# Patient Record
Sex: FEMALE | Race: WHITE | NOT HISPANIC OR LATINO | Employment: OTHER | URBAN - METROPOLITAN AREA
[De-identification: names, ages, dates, MRNs, and addresses within clinical notes are randomized per-mention and may not be internally consistent; named-entity substitution may affect disease eponyms.]

---

## 2018-01-19 ENCOUNTER — RECORDS - HEALTHEAST (OUTPATIENT)
Dept: LAB | Facility: CLINIC | Age: 63
End: 2018-01-19

## 2018-01-19 LAB
CHOLEST SERPL-MCNC: 193 MG/DL
FASTING STATUS PATIENT QL REPORTED: NORMAL
FASTING STATUS PATIENT QL REPORTED: NORMAL
GLUCOSE BLD-MCNC: 85 MG/DL (ref 70–125)
HDLC SERPL-MCNC: 77 MG/DL
LDLC SERPL CALC-MCNC: 104 MG/DL
TRIGL SERPL-MCNC: 58 MG/DL

## 2018-01-23 ENCOUNTER — COMMUNICATION - HEALTHEAST (OUTPATIENT)
Dept: ONCOLOGY | Facility: HOSPITAL | Age: 63
End: 2018-01-23

## 2018-05-15 ENCOUNTER — RECORDS - HEALTHEAST (OUTPATIENT)
Dept: ADMINISTRATIVE | Facility: OTHER | Age: 63
End: 2018-05-15

## 2018-05-24 ENCOUNTER — HOSPITAL ENCOUNTER (OUTPATIENT)
Dept: RESPIRATORY THERAPY | Facility: HOSPITAL | Age: 63
Discharge: HOME | End: 2018-05-24
Attending: SURGERY

## 2018-05-24 DIAGNOSIS — R91.1 LUNG NODULE: ICD-10-CM

## 2018-05-24 LAB
BASE EXCESS BLDA CALC-SCNC: -1.6 MMOL/L
COHGB MFR BLD: 98.2 % (ref 96–97)
HCO3, ARTERIAL CALC - HISTORICAL: 23.6 MMOL/L (ref 23–29)
HGB BLD-MCNC: 13 G/DL (ref 12–16)
O2/TOTAL GAS SETTING VFR VENT: ABNORMAL %
OXYHEMOGLOBIN - HISTORICAL: 95.9 % (ref 96–97)
PCO2 BLD: 37 MM HG (ref 35–45)
PH BLD: 7.4 [PH] (ref 7.37–7.44)
PO2 BLD: 77 MM HG (ref 80–90)
TEMPERATURE: 37 DEGREES C
VENTILATION MODE: ABNORMAL

## 2018-06-01 ENCOUNTER — RECORDS - HEALTHEAST (OUTPATIENT)
Dept: LAB | Facility: CLINIC | Age: 63
End: 2018-06-01

## 2018-06-01 ENCOUNTER — RECORDS - HEALTHEAST (OUTPATIENT)
Dept: ADMINISTRATIVE | Facility: OTHER | Age: 63
End: 2018-06-01

## 2018-06-01 LAB
ANION GAP SERPL CALCULATED.3IONS-SCNC: 10 MMOL/L (ref 5–18)
BUN SERPL-MCNC: 14 MG/DL (ref 8–22)
CALCIUM SERPL-MCNC: 9.3 MG/DL (ref 8.5–10.5)
CHLORIDE BLD-SCNC: 98 MMOL/L (ref 98–107)
CO2 SERPL-SCNC: 25 MMOL/L (ref 22–31)
CREAT SERPL-MCNC: 0.85 MG/DL (ref 0.6–1.1)
GFR SERPL CREATININE-BSD FRML MDRD: >60 ML/MIN/1.73M2
GLUCOSE BLD-MCNC: 89 MG/DL (ref 70–125)
MAGNESIUM SERPL-MCNC: 2.1 MG/DL (ref 1.8–2.6)
POTASSIUM BLD-SCNC: 3.9 MMOL/L (ref 3.5–5)
SODIUM SERPL-SCNC: 133 MMOL/L (ref 136–145)

## 2018-06-06 ENCOUNTER — HOSPITAL ENCOUNTER (OUTPATIENT)
Dept: PET IMAGING | Facility: HOSPITAL | Age: 63
Discharge: HOME | End: 2018-06-06
Attending: SURGERY

## 2018-06-06 DIAGNOSIS — R91.1 LUNG NODULE: ICD-10-CM

## 2018-06-06 LAB — GLUCOSE BLDC GLUCOMTR-MCNC: 78 MG/DL

## 2019-02-14 ENCOUNTER — RECORDS - HEALTHEAST (OUTPATIENT)
Dept: LAB | Facility: CLINIC | Age: 64
End: 2019-02-14

## 2019-02-14 LAB
FASTING STATUS PATIENT QL REPORTED: NORMAL
GLUCOSE BLD-MCNC: 93 MG/DL (ref 70–125)
VIT B12 SERPL-MCNC: 592 PG/ML (ref 213–816)

## 2019-03-28 ENCOUNTER — RECORDS - HEALTHEAST (OUTPATIENT)
Dept: LAB | Facility: CLINIC | Age: 64
End: 2019-03-28

## 2019-03-28 LAB
FOLATE SERPL-MCNC: 14.4 NG/ML
TSH SERPL DL<=0.005 MIU/L-ACNC: 1.26 UIU/ML (ref 0.3–5)

## 2020-03-25 ENCOUNTER — RECORDS - HEALTHEAST (OUTPATIENT)
Dept: ADMINISTRATIVE | Facility: OTHER | Age: 65
End: 2020-03-25

## 2020-04-01 ENCOUNTER — RECORDS - HEALTHEAST (OUTPATIENT)
Dept: ADMINISTRATIVE | Facility: OTHER | Age: 65
End: 2020-04-01

## 2020-04-02 ENCOUNTER — RECORDS - HEALTHEAST (OUTPATIENT)
Dept: LAB | Facility: CLINIC | Age: 65
End: 2020-04-02

## 2020-04-02 LAB
ERYTHROCYTE [DISTWIDTH] IN BLOOD BY AUTOMATED COUNT: 14 % (ref 11–14.5)
HCT VFR BLD AUTO: 37.1 % (ref 35–47)
HGB BLD-MCNC: 11.9 G/DL (ref 12–16)
MCH RBC QN AUTO: 28.1 PG (ref 27–34)
MCHC RBC AUTO-ENTMCNC: 32.1 G/DL (ref 32–36)
MCV RBC AUTO: 88 FL (ref 80–100)
PLATELET # BLD AUTO: 187 THOU/UL (ref 140–440)
PMV BLD AUTO: 9.1 FL (ref 8.5–12.5)
RBC # BLD AUTO: 4.23 MILL/UL (ref 3.8–5.4)
WBC: 53.3 THOU/UL (ref 4–11)

## 2020-04-08 ENCOUNTER — COMMUNICATION - HEALTHEAST (OUTPATIENT)
Dept: PULMONOLOGY | Facility: OTHER | Age: 65
End: 2020-04-08

## 2020-04-08 ENCOUNTER — AMBULATORY - HEALTHEAST (OUTPATIENT)
Dept: PULMONOLOGY | Facility: OTHER | Age: 65
End: 2020-04-08

## 2020-04-08 DIAGNOSIS — J44.9 COPD (CHRONIC OBSTRUCTIVE PULMONARY DISEASE) (H): ICD-10-CM

## 2020-05-06 ENCOUNTER — TRANSFERRED RECORDS (OUTPATIENT)
Dept: HEALTH INFORMATION MANAGEMENT | Facility: CLINIC | Age: 65
End: 2020-05-06
Payer: MEDICARE

## 2020-06-26 ENCOUNTER — COMMUNICATION - HEALTHEAST (OUTPATIENT)
Dept: PULMONOLOGY | Facility: OTHER | Age: 65
End: 2020-06-26

## 2020-07-27 ENCOUNTER — RECORDS - HEALTHEAST (OUTPATIENT)
Dept: LAB | Facility: CLINIC | Age: 65
End: 2020-07-27

## 2020-07-27 LAB
CHOLEST SERPL-MCNC: 197 MG/DL
FASTING STATUS PATIENT QL REPORTED: NORMAL
HDLC SERPL-MCNC: 86 MG/DL
LDLC SERPL CALC-MCNC: 97 MG/DL
TRIGL SERPL-MCNC: 72 MG/DL

## 2020-08-03 ENCOUNTER — TRANSFERRED RECORDS (OUTPATIENT)
Dept: HEALTH INFORMATION MANAGEMENT | Facility: CLINIC | Age: 65
End: 2020-08-03
Payer: MEDICARE

## 2020-08-10 ENCOUNTER — RECORDS - HEALTHEAST (OUTPATIENT)
Dept: ADMINISTRATIVE | Facility: OTHER | Age: 65
End: 2020-08-10

## 2020-08-13 ENCOUNTER — OFFICE VISIT - HEALTHEAST (OUTPATIENT)
Dept: PULMONOLOGY | Facility: OTHER | Age: 65
End: 2020-08-13

## 2020-08-13 DIAGNOSIS — J43.9 PULMONARY EMPHYSEMA, UNSPECIFIED EMPHYSEMA TYPE (H): ICD-10-CM

## 2020-08-13 DIAGNOSIS — J45.31 MILD PERSISTENT ASTHMA WITH EXACERBATION: ICD-10-CM

## 2020-08-13 RX ORDER — MONTELUKAST SODIUM 10 MG/1
10 TABLET ORAL AT BEDTIME
Status: SHIPPED | COMMUNITY
Start: 2020-08-13 | End: 2021-11-17

## 2020-08-13 ASSESSMENT — MIFFLIN-ST. JEOR: SCORE: 1180.45

## 2020-09-03 ENCOUNTER — COMMUNICATION - HEALTHEAST (OUTPATIENT)
Dept: PULMONOLOGY | Facility: OTHER | Age: 65
End: 2020-09-03

## 2020-10-22 ENCOUNTER — OFFICE VISIT - HEALTHEAST (OUTPATIENT)
Dept: PULMONOLOGY | Facility: OTHER | Age: 65
End: 2020-10-22

## 2020-10-22 DIAGNOSIS — J45.31 MILD PERSISTENT ASTHMA WITH EXACERBATION: ICD-10-CM

## 2020-10-22 DIAGNOSIS — J43.9 PULMONARY EMPHYSEMA, UNSPECIFIED EMPHYSEMA TYPE (H): ICD-10-CM

## 2020-10-22 RX ORDER — IPRATROPIUM BROMIDE AND ALBUTEROL SULFATE 2.5; .5 MG/3ML; MG/3ML
SOLUTION RESPIRATORY (INHALATION) PRN
Status: SHIPPED | COMMUNITY
Start: 2020-03-09

## 2020-10-22 ASSESSMENT — MIFFLIN-ST. JEOR: SCORE: 1184.99

## 2020-12-09 ENCOUNTER — OFFICE VISIT (OUTPATIENT)
Dept: URBAN - METROPOLITAN AREA CLINIC 84 | Facility: CLINIC | Age: 65
End: 2020-12-09

## 2020-12-09 ENCOUNTER — WEB ENCOUNTER (OUTPATIENT)
Dept: URBAN - METROPOLITAN AREA CLINIC 84 | Facility: CLINIC | Age: 65
End: 2020-12-09

## 2020-12-09 ENCOUNTER — DASHBOARD ENCOUNTERS (OUTPATIENT)
Age: 65
End: 2020-12-09

## 2020-12-09 VITALS
DIASTOLIC BLOOD PRESSURE: 82 MMHG | BODY MASS INDEX: 36.82 KG/M2 | HEIGHT: 65 IN | TEMPERATURE: 98.2 F | WEIGHT: 221 LBS | RESPIRATION RATE: 18 BRPM | SYSTOLIC BLOOD PRESSURE: 144 MMHG | HEART RATE: 70 BPM

## 2020-12-09 PROBLEM — 24700007: Status: ACTIVE | Noted: 2020-12-09

## 2020-12-09 PROBLEM — 38341003: Status: ACTIVE | Noted: 2020-12-09

## 2020-12-09 PROBLEM — 55822004: Status: ACTIVE | Noted: 2020-12-09

## 2020-12-09 PROBLEM — 162864005: Status: ACTIVE | Noted: 2020-12-09

## 2020-12-09 PROBLEM — 305058001: Status: ACTIVE | Noted: 2020-12-09

## 2020-12-09 RX ORDER — SERTRALINE HYDROCHLORIDE 25 MG/1
1 TABLET TABLET, FILM COATED ORAL ONCE A DAY
Status: ACTIVE | COMMUNITY

## 2020-12-09 RX ORDER — HYDROCHLOROTHIAZIDE 25 MG/1
1 TABLET IN THE MORNING TABLET ORAL ONCE A DAY
Status: ACTIVE | COMMUNITY

## 2020-12-09 RX ORDER — AMLODIPINE BESYLATE 5 MG/1
1 TABLET TABLET ORAL ONCE A DAY
Status: ACTIVE | COMMUNITY

## 2020-12-09 RX ORDER — INTERFERON BETA-1A 44 UG/.5ML
0.5 ML INJECTION, SOLUTION SUBCUTANEOUS
Status: ACTIVE | COMMUNITY

## 2020-12-09 RX ORDER — LOSARTAN POTASSIUM 50 MG/1
1 TABLET TABLET ORAL ONCE A DAY
Status: ACTIVE | COMMUNITY

## 2020-12-09 RX ORDER — MONTELUKAST SODIUM 10 MG/1
1 TABLET TABLET ORAL ONCE A DAY
Status: ACTIVE | COMMUNITY

## 2020-12-09 RX ORDER — ATORVASTATIN CALCIUM 40 MG/1
1 TABLET TABLET, FILM COATED ORAL ONCE A DAY
Status: ACTIVE | COMMUNITY

## 2020-12-09 RX ORDER — CLOPIDOGREL 75 MG/1
1 TABLET TABLET, FILM COATED ORAL ONCE A DAY
Status: ACTIVE | COMMUNITY

## 2020-12-09 RX ORDER — LORATADINE 10 MG
1 TABLET TABLET ORAL ONCE A DAY
Status: ACTIVE | COMMUNITY

## 2020-12-31 LAB
HPV SOURCE: NORMAL
HUMAN PAPILLOMA VIRUS 16 DNA: NEGATIVE
HUMAN PAPILLOMA VIRUS 18 DNA: NEGATIVE
HUMAN PAPILLOMA VIRUS FINAL DIAGNOSIS: NORMAL
HUMAN PAPILLOMA VIRUS OTHER HR: NEGATIVE
SPECIMEN DESCRIPTION: NORMAL

## 2021-01-06 ENCOUNTER — AMBULATORY - HEALTHEAST (OUTPATIENT)
Dept: CARDIOLOGY | Facility: CLINIC | Age: 66
End: 2021-01-06

## 2021-01-06 ENCOUNTER — RECORDS - HEALTHEAST (OUTPATIENT)
Dept: ADMINISTRATIVE | Facility: OTHER | Age: 66
End: 2021-01-06

## 2021-01-07 ENCOUNTER — OFFICE VISIT (OUTPATIENT)
Dept: URBAN - METROPOLITAN AREA SURGERY CENTER 20 | Facility: SURGERY CENTER | Age: 66
End: 2021-01-07
Payer: MEDICARE

## 2021-01-07 ENCOUNTER — OFFICE VISIT - HEALTHEAST (OUTPATIENT)
Dept: CARDIOLOGY | Facility: CLINIC | Age: 66
End: 2021-01-07

## 2021-01-07 DIAGNOSIS — Z12.11 COLON CANCER SCREENING: ICD-10-CM

## 2021-01-07 DIAGNOSIS — R00.2 PALPITATIONS: ICD-10-CM

## 2021-01-07 PROCEDURE — G9935 CANC NOT DETECTD DURING SRCN: HCPCS | Performed by: INTERNAL MEDICINE

## 2021-01-07 PROCEDURE — G8907 PT DOC NO EVENTS ON DISCHARG: HCPCS | Performed by: INTERNAL MEDICINE

## 2021-01-07 PROCEDURE — G0121 COLON CA SCRN NOT HI RSK IND: HCPCS | Performed by: INTERNAL MEDICINE

## 2021-01-07 RX ORDER — INTERFERON BETA-1A 44 UG/.5ML
0.5 ML INJECTION, SOLUTION SUBCUTANEOUS
Status: ACTIVE | COMMUNITY

## 2021-01-07 RX ORDER — MONTELUKAST SODIUM 10 MG/1
1 TABLET TABLET ORAL ONCE A DAY
Status: ACTIVE | COMMUNITY

## 2021-01-07 RX ORDER — HYDROCHLOROTHIAZIDE 25 MG/1
1 TABLET IN THE MORNING TABLET ORAL ONCE A DAY
Status: ACTIVE | COMMUNITY

## 2021-01-07 RX ORDER — AMLODIPINE BESYLATE 5 MG/1
1 TABLET TABLET ORAL ONCE A DAY
Status: ACTIVE | COMMUNITY

## 2021-01-07 RX ORDER — ATORVASTATIN CALCIUM 40 MG/1
1 TABLET TABLET, FILM COATED ORAL ONCE A DAY
Status: ACTIVE | COMMUNITY

## 2021-01-07 RX ORDER — CLOPIDOGREL 75 MG/1
1 TABLET TABLET, FILM COATED ORAL ONCE A DAY
Status: ACTIVE | COMMUNITY

## 2021-01-07 RX ORDER — SERTRALINE HYDROCHLORIDE 25 MG/1
1 TABLET TABLET, FILM COATED ORAL ONCE A DAY
Status: ACTIVE | COMMUNITY

## 2021-01-07 RX ORDER — LORATADINE 10 MG
1 TABLET TABLET ORAL ONCE A DAY
Status: ACTIVE | COMMUNITY

## 2021-01-07 RX ORDER — LOSARTAN POTASSIUM 50 MG/1
1 TABLET TABLET ORAL ONCE A DAY
Status: ACTIVE | COMMUNITY

## 2021-01-07 ASSESSMENT — MIFFLIN-ST. JEOR: SCORE: 1203.13

## 2021-01-11 ENCOUNTER — RECORDS - HEALTHEAST (OUTPATIENT)
Dept: ADMINISTRATIVE | Facility: OTHER | Age: 66
End: 2021-01-11

## 2021-01-11 LAB
BKR LAB AP ABNORMAL BLEEDING: YES
BKR LAB AP BIRTH CONTROL/HORMONES: NORMAL
BKR LAB AP CERVICAL APPEARANCE: NORMAL
BKR LAB AP GYN ADEQUACY: NORMAL
BKR LAB AP GYN INTERPRETATION: NORMAL
BKR LAB AP HPV REFLEX: NORMAL
BKR LAB AP LMP: 2010
BKR LAB AP PATIENT STATUS: NORMAL
BKR LAB AP PREVIOUS ABNORMAL: NORMAL
BKR LAB AP PREVIOUS NORMAL: NORMAL
HIGH RISK?: NO
PATH REPORT.COMMENTS IMP SPEC: NORMAL
RESULT FLAG (HE HISTORICAL CONVERSION): NORMAL

## 2021-01-19 ENCOUNTER — HOSPITAL ENCOUNTER (OUTPATIENT)
Dept: CARDIOLOGY | Facility: HOSPITAL | Age: 66
Discharge: HOME | End: 2021-01-19
Attending: INTERNAL MEDICINE

## 2021-01-19 DIAGNOSIS — R00.2 PALPITATIONS: ICD-10-CM

## 2021-01-26 ENCOUNTER — RECORDS - HEALTHEAST (OUTPATIENT)
Dept: ADMINISTRATIVE | Facility: OTHER | Age: 66
End: 2021-01-26

## 2021-01-26 LAB
LAB AP CHARGES (HE HISTORICAL CONVERSION): NORMAL
PATH REPORT.COMMENTS IMP SPEC: NORMAL
PATH REPORT.COMMENTS IMP SPEC: NORMAL
PATH REPORT.FINAL DX SPEC: NORMAL
PATH REPORT.GROSS SPEC: NORMAL
PATH REPORT.MICROSCOPIC SPEC OTHER STN: NORMAL
PATH REPORT.RELEVANT HX SPEC: NORMAL
RESULT FLAG (HE HISTORICAL CONVERSION): NORMAL

## 2021-06-04 VITALS
OXYGEN SATURATION: 100 % | SYSTOLIC BLOOD PRESSURE: 128 MMHG | BODY MASS INDEX: 21.03 KG/M2 | HEART RATE: 67 BPM | DIASTOLIC BLOOD PRESSURE: 82 MMHG | HEIGHT: 67 IN | WEIGHT: 134 LBS

## 2021-06-05 VITALS
HEART RATE: 63 BPM | WEIGHT: 135 LBS | OXYGEN SATURATION: 99 % | HEIGHT: 67 IN | DIASTOLIC BLOOD PRESSURE: 84 MMHG | BODY MASS INDEX: 21.19 KG/M2 | SYSTOLIC BLOOD PRESSURE: 130 MMHG

## 2021-06-05 VITALS
SYSTOLIC BLOOD PRESSURE: 108 MMHG | DIASTOLIC BLOOD PRESSURE: 60 MMHG | HEIGHT: 67 IN | WEIGHT: 139 LBS | HEART RATE: 81 BPM | BODY MASS INDEX: 21.82 KG/M2 | RESPIRATION RATE: 16 BRPM
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
## 2021-06-10 NOTE — PATIENT INSTRUCTIONS - HE
COPD is short for chronic obstructive pulmonary disease and is a condition in which air becomes stuck in your lungs.   There are 2 different types of abnormalities that are described. You can have one or the other, OR both.   1. Abnormal pictures--->Emphysema (lung tissue is destroyed which causes the air sacs to stretch out and appears as holes on your CT scan).  2. Abnormal symptoms--->Chronic cough (which is referred to as chronic bronchitis).    The medications used to treat this condition help to relax your airways and help air escape your lungs.  1. Please use your Flovent inhaler twice a day every day whether or not you are short of breath, this is not a rescue inhaler so do not use this if you are acutely short of breath.  2. Please be sure to gargle your mouth after using your Flovent inhaler.  3. Please use your albuterol inhaler 2 puffs up to 6 times a day for rescue. If you are not short of breath, you do not need to use this.    Please remember to use your sinus washes at least once to twice a day especially when you are having increased nasal discharge. We would also like to advise you not to use tap or bottle water for your sinus washes and only use distilled or boiled water for this purpose.
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
## 2021-06-16 PROBLEM — R00.2 PALPITATIONS: Status: ACTIVE | Noted: 2018-01-21

## 2021-06-16 PROBLEM — R55 NEAR SYNCOPE: Status: ACTIVE | Noted: 2018-01-21

## 2021-06-18 NOTE — PROGRESS NOTES
Cpft with pre and post spirometry done using albuterol neb 2.5 mg neb.  Room air abgs done.  Ats standards met, patient jessica well, results scanned to patients chart.

## 2021-06-27 ENCOUNTER — HEALTH MAINTENANCE LETTER (OUTPATIENT)
Age: 66
End: 2021-06-27

## 2021-07-27 ENCOUNTER — LAB REQUISITION (OUTPATIENT)
Dept: LAB | Facility: CLINIC | Age: 66
End: 2021-07-27
Payer: MEDICARE

## 2021-07-27 DIAGNOSIS — H04.123 DRY EYE SYNDROME OF BILATERAL LACRIMAL GLANDS: ICD-10-CM

## 2021-07-27 PROCEDURE — 86235 NUCLEAR ANTIGEN ANTIBODY: CPT | Mod: ORL | Performed by: FAMILY MEDICINE

## 2021-07-29 LAB
ENA SS-A AB SER IA-ACNC: <0.5 U/ML
ENA SS-A AB SER IA-ACNC: NEGATIVE
ENA SS-B IGG SER IA-ACNC: <0.6 U/ML
ENA SS-B IGG SER IA-ACNC: NEGATIVE

## 2021-08-02 ENCOUNTER — TRANSFERRED RECORDS (OUTPATIENT)
Dept: HEALTH INFORMATION MANAGEMENT | Facility: CLINIC | Age: 66
End: 2021-08-02
Payer: MEDICARE

## 2021-08-30 ENCOUNTER — LAB REQUISITION (OUTPATIENT)
Dept: LAB | Facility: CLINIC | Age: 66
End: 2021-08-30
Payer: MEDICARE

## 2021-08-30 DIAGNOSIS — R00.2 PALPITATIONS: ICD-10-CM

## 2021-08-30 DIAGNOSIS — J45.31 MILD PERSISTENT ASTHMA WITH EXACERBATION: ICD-10-CM

## 2021-08-30 DIAGNOSIS — J43.9 PULMONARY EMPHYSEMA, UNSPECIFIED EMPHYSEMA TYPE (H): ICD-10-CM

## 2021-08-30 LAB — MAGNESIUM SERPL-MCNC: 2.3 MG/DL (ref 1.8–2.6)

## 2021-08-30 PROCEDURE — 83735 ASSAY OF MAGNESIUM: CPT | Mod: ORL | Performed by: FAMILY MEDICINE

## 2021-08-31 DIAGNOSIS — J45.31 MILD PERSISTENT ASTHMA WITH EXACERBATION: ICD-10-CM

## 2021-08-31 DIAGNOSIS — J43.9 PULMONARY EMPHYSEMA, UNSPECIFIED EMPHYSEMA TYPE (H): ICD-10-CM

## 2021-08-31 DIAGNOSIS — J44.9 COPD (CHRONIC OBSTRUCTIVE PULMONARY DISEASE) (H): Primary | ICD-10-CM

## 2021-08-31 RX ORDER — FLUTICASONE PROPIONATE 110 UG/1
1 AEROSOL, METERED RESPIRATORY (INHALATION) 2 TIMES DAILY
Qty: 12 G | Refills: 11 | Status: SHIPPED | OUTPATIENT
Start: 2021-08-31 | End: 2022-09-15

## 2021-10-17 ENCOUNTER — HEALTH MAINTENANCE LETTER (OUTPATIENT)
Age: 66
End: 2021-10-17

## 2021-11-17 ENCOUNTER — OFFICE VISIT (OUTPATIENT)
Dept: PULMONOLOGY | Facility: OTHER | Age: 66
End: 2021-11-17
Payer: MEDICARE

## 2021-11-17 VITALS
DIASTOLIC BLOOD PRESSURE: 86 MMHG | OXYGEN SATURATION: 99 % | SYSTOLIC BLOOD PRESSURE: 144 MMHG | WEIGHT: 136.4 LBS | HEART RATE: 64 BPM | BODY MASS INDEX: 21.36 KG/M2

## 2021-11-17 DIAGNOSIS — J45.31 MILD PERSISTENT ASTHMA WITH EXACERBATION: Primary | ICD-10-CM

## 2021-11-17 DIAGNOSIS — Z71.89 ADVANCED CARE PLANNING/COUNSELING DISCUSSION: ICD-10-CM

## 2021-11-17 DIAGNOSIS — R91.8 PULMONARY NODULES: ICD-10-CM

## 2021-11-17 PROCEDURE — 99214 OFFICE O/P EST MOD 30 MIN: CPT | Performed by: INTERNAL MEDICINE

## 2021-11-17 NOTE — PATIENT INSTRUCTIONS
1. Website for monoclonal antibodies should you test positive for Covid. You can only receive these within 7 days of testing positive and CURRENTLY, cannot get them if you are hospitalized with Covid infection    [email or web address]  1. This is the website to review for advanced care directives.    [email or web address]
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
## 2022-01-19 ENCOUNTER — HOME INFUSION (PRE-WILLOW HOME INFUSION) (OUTPATIENT)
Dept: PHARMACY | Facility: CLINIC | Age: 67
End: 2022-01-19
Payer: MEDICARE

## 2022-01-21 ENCOUNTER — HOME INFUSION (PRE-WILLOW HOME INFUSION) (OUTPATIENT)
Dept: PHARMACY | Facility: CLINIC | Age: 67
End: 2022-01-21

## 2022-03-28 NOTE — PROGRESS NOTES
This is a recent snapshot of the patient's [facility] medical record.  For current drug dose and complete information and questions, call [phone] or In Basket pool, [facility]v home infusion ([id number])  CSN Number:  [id number]

## 2022-04-30 ENCOUNTER — TELEPHONE ENCOUNTER (OUTPATIENT)
Dept: URBAN - METROPOLITAN AREA CLINIC 121 | Facility: CLINIC | Age: 67
End: 2022-04-30

## 2022-05-01 ENCOUNTER — TELEPHONE ENCOUNTER (OUTPATIENT)
Dept: URBAN - METROPOLITAN AREA CLINIC 121 | Facility: CLINIC | Age: 67
End: 2022-05-01

## 2022-05-01 RX ORDER — LISINOPRIL AND HYDROCHLOROTHIAZIDE TABLETS 20; 25 MG/1; MG/1
QD TABLET ORAL
Status: ACTIVE | COMMUNITY
Start: 2015-04-29

## 2022-05-01 RX ORDER — PAROXETINE HYDROCHLORIDE 40 MG/1
QD TABLET, FILM COATED ORAL
Status: ACTIVE | COMMUNITY
Start: 2015-04-29

## 2022-05-01 RX ORDER — ACETAMINOPHEN 325 MG
QD TABLET ORAL
Status: ACTIVE | COMMUNITY
Start: 2015-04-29

## 2022-05-01 RX ORDER — INTERFERON BETA-1A 44 UG/.5ML
3 TIMES A WK INJECTION, SOLUTION SUBCUTANEOUS
Status: ACTIVE | COMMUNITY
Start: 2015-04-29

## 2022-05-01 RX ORDER — MONTELUKAST 10 MG/1
TABLET, FILM COATED ORAL
Status: ACTIVE | COMMUNITY
Start: 2015-04-29

## 2022-05-01 RX ORDER — CLOPIDOGREL 75 MG/1
QD TABLET ORAL
Status: ACTIVE | COMMUNITY
Start: 2015-04-29

## 2022-05-01 RX ORDER — ALBUTEROL SULFATE 108 UG/1
AEROSOL, METERED RESPIRATORY (INHALATION)
Status: ACTIVE | COMMUNITY
Start: 2015-04-29

## 2022-07-23 ENCOUNTER — HEALTH MAINTENANCE LETTER (OUTPATIENT)
Age: 67
End: 2022-07-23

## 2022-08-15 ENCOUNTER — HOSPITAL ENCOUNTER (OUTPATIENT)
Dept: CT IMAGING | Facility: HOSPITAL | Age: 67
Discharge: HOME | End: 2022-08-15
Attending: INTERNAL MEDICINE | Admitting: INTERNAL MEDICINE
Payer: MEDICARE

## 2022-08-15 DIAGNOSIS — C91.10 CHRONIC LYMPHOID LEUKEMIA (H): ICD-10-CM

## 2022-08-15 LAB
CREAT BLD-MCNC: 1.1 MG/DL (ref 0.6–1.1)
GFR SERPL CREATININE-BSD FRML MDRD: 55 ML/MIN/1.73M2

## 2022-08-15 PROCEDURE — 82565 ASSAY OF CREATININE: CPT

## 2022-08-15 PROCEDURE — 255N000002 HC RX 255 OP 636: Performed by: INTERNAL MEDICINE

## 2022-08-15 PROCEDURE — 74177 CT ABD & PELVIS W/CONTRAST: CPT

## 2022-08-15 RX ADMIN — IOHEXOL 75 ML: 350 INJECTION, SOLUTION INTRAVENOUS at 15:02

## 2022-08-27 NOTE — PROGRESS NOTES
This is a recent snapshot of the patient's [facility] medical record.  For current drug dose and complete information and questions, call [phone] or In Basket pool, [facility]v home infusion ([id number])  CSN Number:  [id number]

## 2022-10-01 ENCOUNTER — HEALTH MAINTENANCE LETTER (OUTPATIENT)
Age: 67
End: 2022-10-01

## 2022-11-14 ENCOUNTER — OFFICE VISIT (OUTPATIENT)
Dept: PULMONOLOGY | Facility: OTHER | Age: 67
End: 2022-11-14
Payer: MEDICARE

## 2022-11-14 VITALS
BODY MASS INDEX: 22.27 KG/M2 | SYSTOLIC BLOOD PRESSURE: 130 MMHG | DIASTOLIC BLOOD PRESSURE: 86 MMHG | HEART RATE: 66 BPM | WEIGHT: 142.2 LBS | OXYGEN SATURATION: 97 %

## 2022-11-14 DIAGNOSIS — J44.9 CHRONIC OBSTRUCTIVE PULMONARY DISEASE, UNSPECIFIED COPD TYPE (H): Primary | ICD-10-CM

## 2022-11-14 PROCEDURE — 99214 OFFICE O/P EST MOD 30 MIN: CPT | Performed by: INTERNAL MEDICINE

## 2022-11-14 RX ORDER — PILOCARPINE HYDROCHLORIDE 10 MG/ML
1-2 SOLUTION/ DROPS OPHTHALMIC 4 TIMES DAILY
COMMUNITY
End: 2024-02-22

## 2022-11-14 RX ORDER — PREDNISOLONE ACETATE 10 MG/ML
1-2 SUSPENSION/ DROPS OPHTHALMIC DAILY
COMMUNITY
End: 2023-03-23

## 2022-11-14 RX ORDER — ALBUTEROL SULFATE 0.83 MG/ML
2.5 SOLUTION RESPIRATORY (INHALATION) EVERY 6 HOURS PRN
Qty: 90 ML | Refills: 0 | Status: SHIPPED | OUTPATIENT
Start: 2022-11-14 | End: 2024-03-05

## 2022-11-14 ASSESSMENT — ASTHMA QUESTIONNAIRES
QUESTION_2 LAST FOUR WEEKS HOW OFTEN HAVE YOU HAD SHORTNESS OF BREATH: THREE TO SIX TIMES A WEEK
ACT_TOTALSCORE: 23
QUESTION_1 LAST FOUR WEEKS HOW MUCH OF THE TIME DID YOUR ASTHMA KEEP YOU FROM GETTING AS MUCH DONE AT WORK, SCHOOL OR AT HOME: NONE OF THE TIME
QUESTION_3 LAST FOUR WEEKS HOW OFTEN DID YOUR ASTHMA SYMPTOMS (WHEEZING, COUGHING, SHORTNESS OF BREATH, CHEST TIGHTNESS OR PAIN) WAKE YOU UP AT NIGHT OR EARLIER THAN USUAL IN THE MORNING: NOT AT ALL
QUESTION_5 LAST FOUR WEEKS HOW WOULD YOU RATE YOUR ASTHMA CONTROL: COMPLETELY CONTROLLED
ACT_TOTALSCORE: 23
QUESTION_4 LAST FOUR WEEKS HOW OFTEN HAVE YOU USED YOUR RESCUE INHALER OR NEBULIZER MEDICATION (SUCH AS ALBUTEROL): NOT AT ALL
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
## 2022-11-14 NOTE — PATIENT INSTRUCTIONS
Asthma flares/exacerbations can vary by person, but usually present with increased shortness of breath, new/worsening wheezing, chest tightness, increase in cough frequency, and/or increase in amount or color of phlegm. COPD exacerbations are usually triggered by viruses, but can be a result of a bacterial infections, or a reaction to some sort of an environmental trigger.    1. If you think you are having a Asthma  exacerbation, do not panic. You should continued taking your inhalers as prescribed. Continue taking all of the maintenance inhalers you are supposed to be using everyday. ADD your quick-relief (rescue) medications:  ALBUTEROL 4 puffs or 1 nebulizer treatment; repeat after 30-60 minutes if needed.    2. If your symptoms are not better in 2-4 hours, you should start your Emergency Plan medications:  PREDNISONE 40 mg (2 tablets) once daily. Take this medication for 5 days.  Call the Pulmonary clinic to notify nursing staff that you think you are having an exacerbation.     3. If your symptoms are severe (very hard to breath even with your rescue medications, you do not feel that your medications are working, you have trouble walking or talking, or you notice your lips/fingernails turning grey or bluish):  Use your rescue medication,  Call your health care provider, AND  Go to the Emergency Room OR CALL [phone]    To talk to Pulmonary clinic nursing staff call [phone].

## 2023-03-23 ENCOUNTER — OFFICE VISIT (OUTPATIENT)
Dept: ALLERGY | Facility: CLINIC | Age: 68
End: 2023-03-23
Payer: MEDICARE

## 2023-03-23 VITALS
HEIGHT: 67 IN | OXYGEN SATURATION: 95 % | HEART RATE: 84 BPM | WEIGHT: 142 LBS | RESPIRATION RATE: 14 BRPM | BODY MASS INDEX: 22.29 KG/M2

## 2023-03-23 DIAGNOSIS — J31.0 CHRONIC RHINITIS: Primary | ICD-10-CM

## 2023-03-23 DIAGNOSIS — J30.89 ALLERGIC RHINITIS CAUSED BY MOLD: ICD-10-CM

## 2023-03-23 PROCEDURE — 95004 PERQ TESTS W/ALRGNC XTRCS: CPT | Performed by: ALLERGY & IMMUNOLOGY

## 2023-03-23 PROCEDURE — 99203 OFFICE O/P NEW LOW 30 MIN: CPT | Mod: 25 | Performed by: ALLERGY & IMMUNOLOGY

## 2023-03-23 RX ORDER — AZELASTINE 1 MG/ML
2 SPRAY, METERED NASAL 2 TIMES DAILY
Qty: 30 ML | Refills: 3 | Status: SHIPPED | OUTPATIENT
Start: 2023-03-23

## 2023-03-23 NOTE — LETTER
"    3/23/2023         RE: Kalie Forrest  [address]   [place] [address]        Dear Colleague,    Thank you for referring your patient, Kalie Forrest, to the [facility]. Please see a copy of my visit note below.          Subjective   Nelly is a 68 year old, presenting for the following health issues:  Allergy Consult (Allergies and asthma, wondering about any testing for intolerance to any foods that may be causing trouble breathing)    HPI     Chief complaint: Food allergy concerns    History of present illness: This is a pleasant 58-year-old woman here today to discuss multiple concerns.  First of which is she states she has a \"busted gut.\"  She states when she eats food she will have immediate abdominal pain and diarrhea.  She states she had work-up with GI including celiac disease testing that was negative.  She is wondering if any foods could be tested that could cause any symptoms.  No hives, swelling or shortness of breath after eating foods.  She also notes that she has a lot of facial pressure and congestion, runny nose and drainage.  States these symptoms occur throughout the year and she is wonders if this could be correlated with her current symptoms.  She is never been allergy tested for environmental allergens.  She does not take any allergy medication regularly.  She was tried on montelukast and Flonase which failed to alleviate symptoms.  She has a history of asthma and COPD and follows with pulmonary for this.  This is well controlled with Flovent.  Denies any cough, wheeze or shortness of breath.  Does not note any correlation with her nasal symptoms and breathing symptoms.    Past medical history: History of CLL          Review of Systems   Constitutional, HEENT, cardiovascular, pulmonary, gi and gu systems are negative, except as otherwise noted.     Objective    Pulse 84   Resp 14   Ht 1.702 m (5' 7\")   Wt 64.4 kg (142 lb)   SpO2 95%   " BMI 22.24 kg/m    Body mass index is 22.24 kg/m .  Physical Exam   Gen: Pleasant female not in acute distress  HEENT: Eyes no erythema of the bulbar or palpebral conjunctiva, no edema. Ears: TMs well visualized, no effusions. Nose: No congestion, mucosa normal. Mouth: Throat clear, no lip or tongue edema.       Psych: Alert and oriented times 3      At today s visit the patient/parent and I engaged in an informed consent discussion about allergy testing.  We discussed skin testing, blood testing,  and the alternative of not undergoing any testing. The patient has a preference for skin testing. We then discussed the risks and benefits of skin testing.  The patient understands skin testing risks can include, but are not limited to, urticaria, angioedema, shortness of breath, and severe anaphylaxis.  The benefits include, but are not limited, to evaluation for allergens causing symptoms.  After answering the patients/parents questions they have agreed to proceed with skin testing.     30 percutaneous test were placed in environmental skin test panel.  Positive histamine control with a small 4 mm response to Aspergillus.        Impression report and plan:  1.  Rhinitis  2.  Adverse food reaction    Symptoms are not consistent with an IgE mediated food allergy.  For this reason she is not a food allergy candidate.  Discussed with FODMAP diet.  For her rhinitis, suspect she has a combination of allergic and nonallergic rhinitis.  Reviewed environmental control.  Recommended Astelin nasal spray as needed.  If symptoms persist, consider CT scan of the sinus and perhaps ENT evaluation.          Again, thank you for allowing me to participate in the care of your patient.        Sincerely,        Sherine QUEZADA MD

## 2023-03-23 NOTE — PROGRESS NOTES
"      Subjective   Nelly is a 68 year old, presenting for the following health issues:  Allergy Consult (Allergies and asthma, wondering about any testing for intolerance to any foods that may be causing trouble breathing)    HPI     Chief complaint: Food allergy concerns    History of present illness: This is a pleasant 58-year-old woman here today to discuss multiple concerns.  First of which is she states she has a \"busted gut.\"  She states when she eats food she will have immediate abdominal pain and diarrhea.  She states she had work-up with GI including celiac disease testing that was negative.  She is wondering if any foods could be tested that could cause any symptoms.  No hives, swelling or shortness of breath after eating foods.  She also notes that she has a lot of facial pressure and congestion, runny nose and drainage.  States these symptoms occur throughout the year and she is wonders if this could be correlated with her current symptoms.  She is never been allergy tested for environmental allergens.  She does not take any allergy medication regularly.  She was tried on montelukast and Flonase which failed to alleviate symptoms.  She has a history of asthma and COPD and follows with pulmonary for this.  This is well controlled with Flovent.  Denies any cough, wheeze or shortness of breath.  Does not note any correlation with her nasal symptoms and breathing symptoms.    Past medical history: History of CLL          Review of Systems   Constitutional, HEENT, cardiovascular, pulmonary, gi and gu systems are negative, except as otherwise noted.      Objective    Pulse 84   Resp 14   Ht 1.702 m (5' 7\")   Wt 64.4 kg (142 lb)   SpO2 95%   BMI 22.24 kg/m    Body mass index is 22.24 kg/m .  Physical Exam   Gen: Pleasant female not in acute distress  HEENT: Eyes no erythema of the bulbar or palpebral conjunctiva, no edema. Ears: TMs well visualized, no effusions. Nose: No congestion, mucosa normal. Mouth: " Throat clear, no lip or tongue edema.       Psych: Alert and oriented times 3      At today s visit the patient/parent and I engaged in an informed consent discussion about allergy testing.  We discussed skin testing, blood testing,  and the alternative of not undergoing any testing. The patient has a preference for skin testing. We then discussed the risks and benefits of skin testing.  The patient understands skin testing risks can include, but are not limited to, urticaria, angioedema, shortness of breath, and severe anaphylaxis.  The benefits include, but are not limited, to evaluation for allergens causing symptoms.  After answering the patients/parents questions they have agreed to proceed with skin testing.      30 percutaneous test were placed in environmental skin test panel.  Positive histamine control with a small 4 mm response to Aspergillus.        Impression report and plan:  1.  Rhinitis  2.  Adverse food reaction    Symptoms are not consistent with an IgE mediated food allergy.  For this reason she is not a food allergy candidate.  Discussed with FODMAP diet.  For her rhinitis, suspect she has a combination of allergic and nonallergic rhinitis.  Reviewed environmental control.  Recommended Astelin nasal spray as needed.  If symptoms persist, consider CT scan of the sinus and perhaps ENT evaluation.

## 2023-05-12 ENCOUNTER — LAB REQUISITION (OUTPATIENT)
Dept: LAB | Facility: CLINIC | Age: 68
End: 2023-05-12
Payer: MEDICARE

## 2023-05-12 DIAGNOSIS — R60.9 EDEMA, UNSPECIFIED: ICD-10-CM

## 2023-05-12 DIAGNOSIS — C91.90 LYMPHOID LEUKEMIA, UNSPECIFIED NOT HAVING ACHIEVED REMISSION (H): ICD-10-CM

## 2023-05-12 DIAGNOSIS — E55.9 VITAMIN D DEFICIENCY, UNSPECIFIED: ICD-10-CM

## 2023-05-12 LAB
ALBUMIN SERPL BCG-MCNC: 4.6 G/DL (ref 3.5–5.2)
ALP SERPL-CCNC: 58 U/L (ref 35–104)
ALT SERPL W P-5'-P-CCNC: 17 U/L (ref 10–35)
ANION GAP SERPL CALCULATED.3IONS-SCNC: 14 MMOL/L (ref 7–15)
AST SERPL W P-5'-P-CCNC: 31 U/L (ref 10–35)
BILIRUB SERPL-MCNC: 0.5 MG/DL
BUN SERPL-MCNC: 11.3 MG/DL (ref 8–23)
CALCIUM SERPL-MCNC: 9.5 MG/DL (ref 8.8–10.2)
CHLORIDE SERPL-SCNC: 98 MMOL/L (ref 98–107)
CREAT SERPL-MCNC: 0.9 MG/DL (ref 0.51–0.95)
DEPRECATED HCO3 PLAS-SCNC: 23 MMOL/L (ref 22–29)
ERYTHROCYTE [DISTWIDTH] IN BLOOD BY AUTOMATED COUNT: 14 % (ref 10–15)
GFR SERPL CREATININE-BSD FRML MDRD: 69 ML/MIN/1.73M2
GLUCOSE SERPL-MCNC: 83 MG/DL (ref 70–99)
HCT VFR BLD AUTO: 41.8 % (ref 35–47)
HGB BLD-MCNC: 13.1 G/DL (ref 11.7–15.7)
IRON BINDING CAPACITY (ROCHE): 312 UG/DL (ref 240–430)
IRON SATN MFR SERPL: 29 % (ref 15–46)
IRON SERPL-MCNC: 90 UG/DL (ref 37–145)
MCH RBC QN AUTO: 27.8 PG (ref 26.5–33)
MCHC RBC AUTO-ENTMCNC: 31.3 G/DL (ref 31.5–36.5)
MCV RBC AUTO: 89 FL (ref 78–100)
PLATELET # BLD AUTO: 163 10E3/UL (ref 150–450)
POTASSIUM SERPL-SCNC: 4.4 MMOL/L (ref 3.4–5.3)
PROT SERPL-MCNC: 6.8 G/DL (ref 6.4–8.3)
RBC # BLD AUTO: 4.72 10E6/UL (ref 3.8–5.2)
SODIUM SERPL-SCNC: 135 MMOL/L (ref 136–145)
TSH SERPL DL<=0.005 MIU/L-ACNC: 1.88 UIU/ML (ref 0.3–4.2)
WBC # BLD AUTO: 109.9 10E3/UL (ref 4–11)

## 2023-05-12 PROCEDURE — 84443 ASSAY THYROID STIM HORMONE: CPT | Mod: ORL | Performed by: PHYSICIAN ASSISTANT

## 2023-05-12 PROCEDURE — 82306 VITAMIN D 25 HYDROXY: CPT | Mod: ORL | Performed by: PHYSICIAN ASSISTANT

## 2023-05-12 PROCEDURE — 80053 COMPREHEN METABOLIC PANEL: CPT | Mod: ORL | Performed by: PHYSICIAN ASSISTANT

## 2023-05-12 PROCEDURE — 85027 COMPLETE CBC AUTOMATED: CPT | Mod: ORL | Performed by: PHYSICIAN ASSISTANT

## 2023-05-12 PROCEDURE — 83550 IRON BINDING TEST: CPT | Mod: ORL | Performed by: PHYSICIAN ASSISTANT

## 2023-05-13 LAB — DEPRECATED CALCIDIOL+CALCIFEROL SERPL-MC: 45 UG/L (ref 20–75)

## 2023-05-14 ENCOUNTER — HEALTH MAINTENANCE LETTER (OUTPATIENT)
Age: 68
End: 2023-05-14

## 2023-06-02 DIAGNOSIS — J44.9 COPD (CHRONIC OBSTRUCTIVE PULMONARY DISEASE) (H): ICD-10-CM

## 2023-06-02 RX ORDER — FLUTICASONE PROPIONATE 110 UG/1
AEROSOL, METERED RESPIRATORY (INHALATION)
Qty: 12 G | Refills: 11 | Status: SHIPPED | OUTPATIENT
Start: 2023-06-02 | End: 2024-02-22

## 2023-06-15 ENCOUNTER — LAB REQUISITION (OUTPATIENT)
Dept: LAB | Facility: CLINIC | Age: 68
End: 2023-06-15
Payer: MEDICARE

## 2023-06-15 DIAGNOSIS — Z13.220 ENCOUNTER FOR SCREENING FOR LIPOID DISORDERS: ICD-10-CM

## 2023-06-15 DIAGNOSIS — R60.0 LOCALIZED EDEMA: ICD-10-CM

## 2023-06-15 LAB
ANION GAP SERPL CALCULATED.3IONS-SCNC: 11 MMOL/L (ref 7–15)
BUN SERPL-MCNC: 18.9 MG/DL (ref 8–23)
CALCIUM SERPL-MCNC: 9.5 MG/DL (ref 8.8–10.2)
CHLORIDE SERPL-SCNC: 96 MMOL/L (ref 98–107)
CHOLEST SERPL-MCNC: 210 MG/DL
CREAT SERPL-MCNC: 1.08 MG/DL (ref 0.51–0.95)
DEPRECATED HCO3 PLAS-SCNC: 29 MMOL/L (ref 22–29)
GFR SERPL CREATININE-BSD FRML MDRD: 56 ML/MIN/1.73M2
GLUCOSE SERPL-MCNC: 76 MG/DL (ref 70–99)
HDLC SERPL-MCNC: 73 MG/DL
LDLC SERPL CALC-MCNC: 125 MG/DL
NONHDLC SERPL-MCNC: 137 MG/DL
POTASSIUM SERPL-SCNC: 4.1 MMOL/L (ref 3.4–5.3)
SODIUM SERPL-SCNC: 136 MMOL/L (ref 136–145)
TRIGL SERPL-MCNC: 62 MG/DL

## 2023-06-15 PROCEDURE — 80048 BASIC METABOLIC PNL TOTAL CA: CPT | Mod: ORL | Performed by: PHYSICIAN ASSISTANT

## 2023-06-15 PROCEDURE — 80061 LIPID PANEL: CPT | Mod: ORL | Performed by: PHYSICIAN ASSISTANT

## 2023-08-14 ENCOUNTER — HOSPITAL ENCOUNTER (OUTPATIENT)
Dept: CT IMAGING | Facility: HOSPITAL | Age: 68
Discharge: HOME | End: 2023-08-14
Attending: INTERNAL MEDICINE | Admitting: INTERNAL MEDICINE
Payer: MEDICARE

## 2023-08-14 DIAGNOSIS — C91.10 CLL (CHRONIC LYMPHOID LEUKEMIA) WITH FAILED REMISSION (H): ICD-10-CM

## 2023-08-14 PROCEDURE — 74177 CT ABD & PELVIS W/CONTRAST: CPT

## 2023-08-14 PROCEDURE — 250N000011 HC RX IP 250 OP 636: Performed by: INTERNAL MEDICINE

## 2023-08-14 RX ORDER — IOPAMIDOL 755 MG/ML
67 INJECTION, SOLUTION INTRAVASCULAR ONCE
Status: COMPLETED | OUTPATIENT
Start: 2023-08-14 | End: 2023-08-14

## 2023-08-14 RX ADMIN — IOPAMIDOL 67 ML: 755 INJECTION, SOLUTION INTRAVENOUS at 09:49

## 2023-09-28 ENCOUNTER — HOSPITAL ENCOUNTER (OUTPATIENT)
Dept: MRI IMAGING | Facility: HOSPITAL | Age: 68
Discharge: HOME | End: 2023-09-28
Attending: INTERNAL MEDICINE | Admitting: INTERNAL MEDICINE
Payer: MEDICARE

## 2023-09-28 DIAGNOSIS — C91.10 CLL (CHRONIC LYMPHOCYTIC LEUKEMIA) (H): ICD-10-CM

## 2023-09-28 PROCEDURE — A9585 GADOBUTROL INJECTION: HCPCS | Mod: JZ | Performed by: INTERNAL MEDICINE

## 2023-09-28 PROCEDURE — 72197 MRI PELVIS W/O & W/DYE: CPT

## 2023-09-28 PROCEDURE — 255N000002 HC RX 255 OP 636: Mod: JZ | Performed by: INTERNAL MEDICINE

## 2023-09-28 RX ORDER — GADOBUTROL 604.72 MG/ML
0.1 INJECTION INTRAVENOUS ONCE
Status: COMPLETED | OUTPATIENT
Start: 2023-09-28 | End: 2023-09-28

## 2023-09-28 RX ADMIN — GADOBUTROL 6 ML: 604.72 INJECTION INTRAVENOUS at 13:16

## 2023-10-16 ASSESSMENT — ASTHMA QUESTIONNAIRES: ACT_TOTALSCORE: 21

## 2023-10-20 ENCOUNTER — OFFICE VISIT (OUTPATIENT)
Dept: PULMONOLOGY | Facility: CLINIC | Age: 68
End: 2023-10-20
Payer: MEDICARE

## 2023-10-20 VITALS
BODY MASS INDEX: 23.18 KG/M2 | SYSTOLIC BLOOD PRESSURE: 120 MMHG | WEIGHT: 148 LBS | DIASTOLIC BLOOD PRESSURE: 64 MMHG | HEART RATE: 62 BPM | OXYGEN SATURATION: 99 %

## 2023-10-20 DIAGNOSIS — J42 CHRONIC BRONCHITIS, UNSPECIFIED CHRONIC BRONCHITIS TYPE (H): Primary | ICD-10-CM

## 2023-10-20 PROCEDURE — 99214 OFFICE O/P EST MOD 30 MIN: CPT | Performed by: INTERNAL MEDICINE

## 2023-10-20 RX ORDER — ALBUTEROL SULFATE 90 UG/1
2 AEROSOL, METERED RESPIRATORY (INHALATION) EVERY 6 HOURS PRN
Qty: 18 G | Refills: 11 | Status: SHIPPED | OUTPATIENT
Start: 2023-10-20 | End: 2024-02-22

## 2023-10-20 NOTE — PATIENT INSTRUCTIONS
I have switched you from Flovent to Arnuity Ellipta per your new insurance coverage model.  Use your Arnuity ellipta, 1 puff once a day. Please rinse out your mouth after you use this.  I have sent a prescription for Ventolin to your pharmacy.  Please use your flutter valve 2 to 3 times a day as needed to help with bringing up secretions.
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
## 2024-01-25 ENCOUNTER — TELEPHONE (OUTPATIENT)
Dept: PULMONOLOGY | Facility: CLINIC | Age: 69
End: 2024-01-25
Payer: MEDICARE

## 2024-01-25 ENCOUNTER — MYC MEDICAL ADVICE (OUTPATIENT)
Dept: PULMONOLOGY | Facility: CLINIC | Age: 69
End: 2024-01-25
Payer: MEDICARE

## 2024-01-25 DIAGNOSIS — J44.1 COPD EXACERBATION (H): Primary | ICD-10-CM

## 2024-01-25 RX ORDER — DOXYCYCLINE 100 MG/1
100 CAPSULE ORAL 2 TIMES DAILY
Qty: 10 CAPSULE | Refills: 0 | Status: SHIPPED | OUTPATIENT
Start: 2024-01-25 | End: 2024-01-30

## 2024-01-25 RX ORDER — PREDNISONE 20 MG/1
TABLET ORAL
Qty: 10 TABLET | Refills: 0 | Status: SHIPPED | OUTPATIENT
Start: 2024-01-25 | End: 2024-02-22

## 2024-01-25 NOTE — TELEPHONE ENCOUNTER
[facility] Call Center    Phone Message    May a detailed message be left on voicemail: yes     Reason for Call: Other: Nelly called and said she's wheezy and having Exacerbation asthma COPD, she needs meds to assist. Please contact Nelly asap looking for albuterol and a bronchodilator      Action Taken: Other: Pulm    Travel Screening: Not Applicable

## 2024-01-25 NOTE — TELEPHONE ENCOUNTER
Called and spoke with Nelly.  She does not need albuterol neb solution, she has some at home already.  Prescriptions for her action plan also sent to her pharmacy earlier today.

## 2024-01-30 ENCOUNTER — MYC MEDICAL ADVICE (OUTPATIENT)
Dept: PULMONOLOGY | Facility: CLINIC | Age: 69
End: 2024-01-30
Payer: MEDICARE

## 2024-01-30 DIAGNOSIS — B37.0 THRUSH: Primary | ICD-10-CM

## 2024-01-30 RX ORDER — NYSTATIN 100000/ML
500000 SUSPENSION, ORAL (FINAL DOSE FORM) ORAL 4 TIMES DAILY
Qty: 200 ML | Refills: 0 | Status: SHIPPED | OUTPATIENT
Start: 2024-01-30 | End: 2024-02-09

## 2024-01-30 NOTE — TELEPHONE ENCOUNTER
Spoke with patient. Will send nystatin swish and swallow to treat her thrush.  She will call her insurance and see they will cover any ICS inhalers that are aerosol instead of the powder.

## 2024-02-22 ENCOUNTER — OFFICE VISIT (OUTPATIENT)
Dept: PULMONOLOGY | Facility: CLINIC | Age: 69
End: 2024-02-22
Attending: INTERNAL MEDICINE
Payer: MEDICARE

## 2024-02-22 VITALS
DIASTOLIC BLOOD PRESSURE: 84 MMHG | OXYGEN SATURATION: 97 % | BODY MASS INDEX: 23.34 KG/M2 | HEART RATE: 75 BPM | SYSTOLIC BLOOD PRESSURE: 138 MMHG | WEIGHT: 149 LBS

## 2024-02-22 DIAGNOSIS — J44.89 ASTHMA-COPD OVERLAP SYNDROME (H): Primary | ICD-10-CM

## 2024-02-22 PROCEDURE — 99214 OFFICE O/P EST MOD 30 MIN: CPT | Performed by: INTERNAL MEDICINE

## 2024-02-22 RX ORDER — TIOTROPIUM BROMIDE AND OLODATEROL 3.124; 2.736 UG/1; UG/1
2 SPRAY, METERED RESPIRATORY (INHALATION) DAILY
Qty: 4 G | Refills: 11 | Status: SHIPPED | OUTPATIENT
Start: 2024-02-22

## 2024-02-22 ASSESSMENT — ASTHMA QUESTIONNAIRES
QUESTION_5 LAST FOUR WEEKS HOW WOULD YOU RATE YOUR ASTHMA CONTROL: SOMEWHAT CONTROLLED
QUESTION_3 LAST FOUR WEEKS HOW OFTEN DID YOUR ASTHMA SYMPTOMS (WHEEZING, COUGHING, SHORTNESS OF BREATH, CHEST TIGHTNESS OR PAIN) WAKE YOU UP AT NIGHT OR EARLIER THAN USUAL IN THE MORNING: NOT AT ALL
QUESTION_1 LAST FOUR WEEKS HOW MUCH OF THE TIME DID YOUR ASTHMA KEEP YOU FROM GETTING AS MUCH DONE AT WORK, SCHOOL OR AT HOME: A LITTLE OF THE TIME
QUESTION_2 LAST FOUR WEEKS HOW OFTEN HAVE YOU HAD SHORTNESS OF BREATH: THREE TO SIX TIMES A WEEK
QUESTION_4 LAST FOUR WEEKS HOW OFTEN HAVE YOU USED YOUR RESCUE INHALER OR NEBULIZER MEDICATION (SUCH AS ALBUTEROL): TWO OR THREE TIMES PER WEEK
ACT_TOTALSCORE: 18
ACT_TOTALSCORE: 18

## 2024-02-22 NOTE — PATIENT INSTRUCTIONS
Please use your Stiolto inhaler 2 puffs once a day every day whether or not you are short of breath, this is not a rescue inhaler so do not use this if you are acutely short of breath.  Please use your albuterol inhaler 2 puffs up to 6 times a day for rescue. If you are not short of breath, you do not need to use this.  Please use your nebulizer and Aerobika device once a day for the next month, if this helps with your secretion clearance, please continue to use it.  Please do not use  your Arnuity inhaler for now.
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
## 2024-02-23 ENCOUNTER — TELEPHONE (OUTPATIENT)
Dept: PULMONOLOGY | Facility: CLINIC | Age: 69
End: 2024-02-23
Payer: MEDICARE

## 2024-02-23 DIAGNOSIS — J44.89 ASTHMA-COPD OVERLAP SYNDROME (H): Primary | ICD-10-CM

## 2024-02-23 NOTE — TELEPHONE ENCOUNTER
"Prior Authorization Retail Medication Request    Medication/Dose: Stiolto respimat    Diagnosis and ICD code (if different than what is on RX):    New/renewal/insurance change PA/secondary ins. PA:  Previously Tried and Failed:  Arnuity ellipta, albuterol  Rationale:  not effective and \"raspy\" voice from the powder form of inhaler.  Needs LAMA/LABA- no powder forms    Insurance   Primary: [employer] [place]   Insurance ID:  [id number]     Secondary (if applicable):  Insurance ID:      Pharmacy Information (if different than what is on RX)  Name:  [facility], [place]  Phone:  [phone]  Fax:[phone]    "

## 2024-03-05 DIAGNOSIS — J44.9 CHRONIC OBSTRUCTIVE PULMONARY DISEASE, UNSPECIFIED COPD TYPE (H): ICD-10-CM

## 2024-03-05 RX ORDER — ALBUTEROL SULFATE 0.83 MG/ML
2.5 SOLUTION RESPIRATORY (INHALATION) EVERY 6 HOURS PRN
Qty: 90 ML | Refills: 0 | Status: SHIPPED | OUTPATIENT
Start: 2024-03-05

## 2024-03-07 NOTE — TELEPHONE ENCOUNTER
Retail Pharmacy Prior Authorization Team   Phone: [phone]    PA Initiation    Medication: STIOLTO RESPIMAT 2.5-2.5 MCG/ACT IN AERS  Insurance Company: [employer] - Phone [phone] Fax [phone]  Pharmacy Filling the Rx: [id number] - [place], [place] - [facility]  Filling Pharmacy Phone: [phone]  Filling Pharmacy Fax:    Start Date: 3/7/2024

## 2024-03-08 DIAGNOSIS — J44.89 ASTHMA-COPD OVERLAP SYNDROME (H): Primary | ICD-10-CM

## 2024-03-08 RX ORDER — GLYCOPYRROLATE AND FORMOTEROL FUMARATE 9; 4.8 UG/1; UG/1
2 AEROSOL, METERED RESPIRATORY (INHALATION) 2 TIMES DAILY
Qty: 10.7 G | Refills: 11 | Status: SHIPPED | OUTPATIENT
Start: 2024-03-08

## 2024-03-10 NOTE — TELEPHONE ENCOUNTER
PRIOR AUTHORIZATION DENIED    Medication: STIOLTO RESPIMAT 2.5-2.5 MCG/ACT IN AERS  Insurance Company: [employer] - Phone [phone] Fax [phone]  Denial Date: 3/8/2024  Denial Reason(s):       Appeal Information:

## 2024-03-11 RX ORDER — UMECLIDINIUM BROMIDE AND VILANTEROL TRIFENATATE 62.5; 25 UG/1; UG/1
1 POWDER RESPIRATORY (INHALATION) DAILY
Qty: 60 EACH | Refills: 11 | Status: SHIPPED | OUTPATIENT
Start: 2024-03-11

## 2024-03-26 ENCOUNTER — HOSPITAL ENCOUNTER (OUTPATIENT)
Dept: MRI IMAGING | Facility: HOSPITAL | Age: 69
Discharge: HOME | End: 2024-03-26
Attending: INTERNAL MEDICINE | Admitting: INTERNAL MEDICINE
Payer: MEDICARE

## 2024-03-26 DIAGNOSIS — C91.10 CLL (CHRONIC LYMPHOCYTIC LEUKEMIA) (H): ICD-10-CM

## 2024-03-26 PROCEDURE — 72197 MRI PELVIS W/O & W/DYE: CPT

## 2024-03-26 PROCEDURE — 255N000002 HC RX 255 OP 636: Performed by: INTERNAL MEDICINE

## 2024-03-26 PROCEDURE — A9585 GADOBUTROL INJECTION: HCPCS | Performed by: INTERNAL MEDICINE

## 2024-03-26 RX ORDER — GADOBUTROL 604.72 MG/ML
0.1 INJECTION INTRAVENOUS ONCE
Status: COMPLETED | OUTPATIENT
Start: 2024-03-26 | End: 2024-03-26

## 2024-03-26 RX ADMIN — GADOBUTROL 7 ML: 604.72 INJECTION INTRAVENOUS at 14:03

## 2024-03-28 ENCOUNTER — TRANSFERRED RECORDS (OUTPATIENT)
Dept: HEALTH INFORMATION MANAGEMENT | Facility: CLINIC | Age: 69
End: 2024-03-28

## 2024-05-14 ENCOUNTER — TELEPHONE (OUTPATIENT)
Dept: PULMONOLOGY | Facility: CLINIC | Age: 69
End: 2024-05-14

## 2024-07-21 ENCOUNTER — HEALTH MAINTENANCE LETTER (OUTPATIENT)
Age: 69
End: 2024-07-21

## 2024-08-27 ENCOUNTER — OFFICE VISIT (OUTPATIENT)
Dept: PULMONOLOGY | Facility: CLINIC | Age: 69
End: 2024-08-27
Attending: INTERNAL MEDICINE
Payer: MEDICARE

## 2024-08-27 VITALS
OXYGEN SATURATION: 97 % | BODY MASS INDEX: 23.18 KG/M2 | WEIGHT: 148 LBS | SYSTOLIC BLOOD PRESSURE: 130 MMHG | DIASTOLIC BLOOD PRESSURE: 72 MMHG | HEART RATE: 55 BPM

## 2024-08-27 DIAGNOSIS — J44.89 ASTHMA-COPD OVERLAP SYNDROME (H): Primary | ICD-10-CM

## 2024-08-27 PROCEDURE — 99214 OFFICE O/P EST MOD 30 MIN: CPT | Performed by: INTERNAL MEDICINE

## 2024-08-27 PROCEDURE — G2211 COMPLEX E/M VISIT ADD ON: HCPCS | Performed by: INTERNAL MEDICINE

## 2024-08-27 RX ORDER — CLOBETASOL PROPIONATE 0.5 MG/G
CREAM TOPICAL
COMMUNITY

## 2024-08-27 RX ORDER — CYCLOBENZAPRINE HCL 10 MG
10 TABLET ORAL 3 TIMES DAILY PRN
COMMUNITY

## 2024-08-27 ASSESSMENT — ASTHMA QUESTIONNAIRES
ACT_TOTALSCORE: 19
QUESTION_5 LAST FOUR WEEKS HOW WOULD YOU RATE YOUR ASTHMA CONTROL: WELL CONTROLLED
QUESTION_4 LAST FOUR WEEKS HOW OFTEN HAVE YOU USED YOUR RESCUE INHALER OR NEBULIZER MEDICATION (SUCH AS ALBUTEROL): TWO OR THREE TIMES PER WEEK
QUESTION_2 LAST FOUR WEEKS HOW OFTEN HAVE YOU HAD SHORTNESS OF BREATH: ONCE OR TWICE A WEEK
QUESTION_3 LAST FOUR WEEKS HOW OFTEN DID YOUR ASTHMA SYMPTOMS (WHEEZING, COUGHING, SHORTNESS OF BREATH, CHEST TIGHTNESS OR PAIN) WAKE YOU UP AT NIGHT OR EARLIER THAN USUAL IN THE MORNING: ONCE OR TWICE
ACT_TOTALSCORE: 19
QUESTION_1 LAST FOUR WEEKS HOW MUCH OF THE TIME DID YOUR ASTHMA KEEP YOU FROM GETTING AS MUCH DONE AT WORK, SCHOOL OR AT HOME: A LITTLE OF THE TIME
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
## 2024-10-28 DIAGNOSIS — J42 CHRONIC BRONCHITIS, UNSPECIFIED CHRONIC BRONCHITIS TYPE (H): ICD-10-CM

## 2024-11-21 DIAGNOSIS — J44.89 ASTHMA-COPD OVERLAP SYNDROME (H): Primary | ICD-10-CM

## 2024-11-21 RX ORDER — ALBUTEROL SULFATE 90 UG/1
2 INHALANT RESPIRATORY (INHALATION) EVERY 6 HOURS PRN
Qty: 18 G | Refills: 3 | Status: SHIPPED | OUTPATIENT
Start: 2024-11-21

## 2024-12-17 ENCOUNTER — MYC MEDICAL ADVICE (OUTPATIENT)
Dept: PULMONOLOGY | Facility: CLINIC | Age: 69
End: 2024-12-17
Payer: MEDICARE

## 2024-12-17 DIAGNOSIS — J44.89 ASTHMA-COPD OVERLAP SYNDROME (H): Primary | ICD-10-CM

## 2024-12-17 RX ORDER — DOXYCYCLINE 100 MG/1
100 CAPSULE ORAL 2 TIMES DAILY
Qty: 10 CAPSULE | Refills: 0 | Status: SHIPPED | OUTPATIENT
Start: 2024-12-17 | End: 2024-12-22

## 2024-12-17 RX ORDER — PREDNISONE 20 MG/1
TABLET ORAL
Qty: 10 TABLET | Refills: 0 | Status: SHIPPED | OUTPATIENT
Start: 2024-12-17

## 2025-03-24 ENCOUNTER — HOSPITAL ENCOUNTER (OUTPATIENT)
Dept: MRI IMAGING | Facility: HOSPITAL | Age: 70
Discharge: HOME | End: 2025-03-24
Attending: INTERNAL MEDICINE | Admitting: INTERNAL MEDICINE
Payer: MEDICARE

## 2025-03-24 DIAGNOSIS — C91.10 CLL (CHRONIC LYMPHOCYTIC LEUKEMIA) (H): ICD-10-CM

## 2025-03-24 PROCEDURE — 255N000002 HC RX 255 OP 636: Performed by: INTERNAL MEDICINE

## 2025-03-24 PROCEDURE — A9585 GADOBUTROL INJECTION: HCPCS | Performed by: INTERNAL MEDICINE

## 2025-03-24 PROCEDURE — 72197 MRI PELVIS W/O & W/DYE: CPT

## 2025-03-24 RX ORDER — GADOBUTROL 604.72 MG/ML
0.1 INJECTION INTRAVENOUS ONCE
Status: COMPLETED | OUTPATIENT
Start: 2025-03-24 | End: 2025-03-24

## 2025-03-24 RX ADMIN — GADOBUTROL 6 ML: 604.72 INJECTION INTRAVENOUS at 11:27

## 2025-03-31 DIAGNOSIS — J44.89 ASTHMA-COPD OVERLAP SYNDROME (H): ICD-10-CM

## 2025-03-31 RX ORDER — GLYCOPYRROLATE AND FORMOTEROL FUMARATE 9; 4.8 UG/1; UG/1
2 AEROSOL, METERED RESPIRATORY (INHALATION) 2 TIMES DAILY
Qty: 10.7 G | Refills: 3 | Status: SHIPPED | OUTPATIENT
Start: 2025-03-31

## 2025-06-10 ENCOUNTER — MYC MEDICAL ADVICE (OUTPATIENT)
Dept: PULMONOLOGY | Facility: CLINIC | Age: 70
End: 2025-06-10
Payer: MEDICARE

## 2025-06-10 DIAGNOSIS — J44.89 ASTHMA-COPD OVERLAP SYNDROME (H): ICD-10-CM

## 2025-06-10 RX ORDER — GLYCOPYRROLATE AND FORMOTEROL FUMARATE 9; 4.8 UG/1; UG/1
2 AEROSOL, METERED RESPIRATORY (INHALATION) 2 TIMES DAILY
Qty: 10.7 G | Refills: 3 | Status: SHIPPED | OUTPATIENT
Start: 2025-06-10

## 2025-07-09 ENCOUNTER — LAB REQUISITION (OUTPATIENT)
Dept: LAB | Facility: CLINIC | Age: 70
End: 2025-07-09
Payer: MEDICARE

## 2025-07-09 DIAGNOSIS — E55.9 VITAMIN D DEFICIENCY, UNSPECIFIED: ICD-10-CM

## 2025-07-09 DIAGNOSIS — M79.89 OTHER SPECIFIED SOFT TISSUE DISORDERS: ICD-10-CM

## 2025-07-09 LAB
ALBUMIN SERPL BCG-MCNC: 4.3 G/DL (ref 3.5–5.2)
ALP SERPL-CCNC: 55 U/L (ref 40–150)
ALT SERPL W P-5'-P-CCNC: 21 U/L (ref 0–50)
ANION GAP SERPL CALCULATED.3IONS-SCNC: 12 MMOL/L (ref 7–15)
AST SERPL W P-5'-P-CCNC: 35 U/L (ref 0–45)
BILIRUB SERPL-MCNC: 0.5 MG/DL
BUN SERPL-MCNC: 11.7 MG/DL (ref 8–23)
CALCIUM SERPL-MCNC: 9.6 MG/DL (ref 8.8–10.4)
CHLORIDE SERPL-SCNC: 96 MMOL/L (ref 98–107)
CHOLEST SERPL-MCNC: 217 MG/DL
CREAT SERPL-MCNC: 0.93 MG/DL (ref 0.51–0.95)
CRP SERPL-MCNC: <3 MG/L
EGFRCR SERPLBLD CKD-EPI 2021: 66 ML/MIN/1.73M2
FASTING STATUS PATIENT QL REPORTED: NO
FASTING STATUS PATIENT QL REPORTED: NO
GLUCOSE SERPL-MCNC: 91 MG/DL (ref 70–99)
HCO3 SERPL-SCNC: 24 MMOL/L (ref 22–29)
HDLC SERPL-MCNC: 79 MG/DL
LDLC SERPL CALC-MCNC: 118 MG/DL
NONHDLC SERPL-MCNC: 138 MG/DL
POTASSIUM SERPL-SCNC: 4.8 MMOL/L (ref 3.4–5.3)
PROT SERPL-MCNC: 6.6 G/DL (ref 6.4–8.3)
RHEUMATOID FACT SERPL-ACNC: <10 IU/ML
SODIUM SERPL-SCNC: 132 MMOL/L (ref 135–145)
TRIGL SERPL-MCNC: 98 MG/DL
URATE SERPL-MCNC: 5.7 MG/DL (ref 2.4–5.7)

## 2025-07-09 PROCEDURE — 80053 COMPREHEN METABOLIC PANEL: CPT | Mod: ORL | Performed by: FAMILY MEDICINE

## 2025-07-09 PROCEDURE — 86140 C-REACTIVE PROTEIN: CPT | Mod: ORL | Performed by: FAMILY MEDICINE

## 2025-07-09 PROCEDURE — 86431 RHEUMATOID FACTOR QUANT: CPT | Mod: ORL | Performed by: FAMILY MEDICINE

## 2025-07-09 PROCEDURE — 84550 ASSAY OF BLOOD/URIC ACID: CPT | Mod: ORL | Performed by: FAMILY MEDICINE

## 2025-07-09 PROCEDURE — 82652 VIT D 1 25-DIHYDROXY: CPT | Mod: ORL | Performed by: FAMILY MEDICINE

## 2025-07-09 PROCEDURE — 86618 LYME DISEASE ANTIBODY: CPT | Mod: ORL | Performed by: FAMILY MEDICINE

## 2025-07-09 PROCEDURE — 80061 LIPID PANEL: CPT | Mod: ORL | Performed by: FAMILY MEDICINE

## 2025-07-10 LAB
1,25(OH)2D SERPL-MCNC: 50.2 PG/ML (ref 19.9–79.3)
B BURGDOR IGG+IGM SER QL: 0.04

## 2025-07-20 ENCOUNTER — HEALTH MAINTENANCE LETTER (OUTPATIENT)
Age: 70
End: 2025-07-20